# Patient Record
(demographics unavailable — no encounter records)

---

## 2024-12-06 NOTE — HISTORY OF PRESENT ILLNESS
[FreeTextEntry1] : 30 yo c/o left lower pelvic pain and pain in vagina with intercourse recently. She has noted slight urinary frequency. Saw Pcp yesterday and asked for std testing, pending. She has no fishy odor, slight itching. Pt used metrogel last night.  ua today -negative leuks, nitrites and blood

## 2024-12-06 NOTE — PLAN
The office explained that we are unable to print off the results as she has requested.   Patient isn't happy with my answer and hung up.    [FreeTextEntry1] : pelvic pain-rt for pelvic sono, possible cyst bv culture done ua-will send for culture